# Patient Record
Sex: FEMALE | Race: BLACK OR AFRICAN AMERICAN | Employment: PART TIME
[De-identification: names, ages, dates, MRNs, and addresses within clinical notes are randomized per-mention and may not be internally consistent; named-entity substitution may affect disease eponyms.]

---

## 2017-10-01 ENCOUNTER — HEALTH MAINTENANCE LETTER (OUTPATIENT)
Age: 52
End: 2017-10-01

## 2019-12-17 ENCOUNTER — THERAPY VISIT (OUTPATIENT)
Dept: PHYSICAL THERAPY | Facility: CLINIC | Age: 54
End: 2019-12-17
Payer: COMMERCIAL

## 2019-12-17 DIAGNOSIS — M54.50 THORACOLUMBAR BACK PAIN: ICD-10-CM

## 2019-12-17 DIAGNOSIS — M54.6 THORACOLUMBAR BACK PAIN: ICD-10-CM

## 2019-12-17 DIAGNOSIS — M54.2 NECK PAIN: ICD-10-CM

## 2019-12-17 PROCEDURE — 97162 PT EVAL MOD COMPLEX 30 MIN: CPT | Mod: GP | Performed by: PHYSICAL THERAPIST

## 2019-12-17 PROCEDURE — 97112 NEUROMUSCULAR REEDUCATION: CPT | Mod: GP | Performed by: PHYSICAL THERAPIST

## 2019-12-17 PROCEDURE — 97110 THERAPEUTIC EXERCISES: CPT | Mod: GP | Performed by: PHYSICAL THERAPIST

## 2019-12-17 NOTE — PROGRESS NOTES
[facility] Initial Evaluation  Subjective:    Rachel Gallegos being seen for Neck and upper back.   Problem began 12/13/2019 (MD appt). Where condition occurred: for unknown reasons.Problem occurred: Recent flare-up 2-3 weeks ago of an old lifting injury x5 years ago  and reported as 6/10 (up to 8/10) on pain scale. General health as reported by patient is good. Pertinent medical history includes:  High blood pressure, menopausal and overweight.  Medical allergies: none.  Surgeries include:  Orthopedic surgery (Bunion L foot x3 years ago).  Current medications:  High blood pressure medication.   Primary job tasks include:  Prolonged sitting and prolonged standing.  Pain is described as aching and is constant. Pain is worse during the night. Since onset symptoms are gradually improving. Imaging testing: none. Previous treatment includes physical therapy. There was moderate improvement following previous treatment.   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    Barriers include:  None as reported by patient.  Red flags:  None as reported by patient.  Type of problem:  Cervical spine and thoracic spine (and lumbar)   Condition occurred with:  Insidious onset. This is a recurrent condition    Patient reports pain:  Cervical left side and thoracic right side.  Associated symptoms:  Loss of strength and loss of motion/stiffness. Symptoms are exacerbated by sitting, lying down, looking up or down, driving and rotating head (standing) and relieved by heat and ice.                      Objective:    Gait:    Gait Type:  Normal   Assistive Devices:  None      Flexibility/Screens:       Lower Extremity:  Decreased left lower extremity flexibility:Hamstrings and Gastroc    Decreased right lower extremity flexibility:  Hamstrings and Gastroc  Spine:      Decreased right spine flexibility:  Quadratus Lumborum             Lumbar/SI Evaluation    Lumbar Myotomes:   normal                Lumbar Dermtomes:  normal                Neural Tension/Mobility:  Lumbar:  Normal        Lumbar Palpation:    Tenderness present at Left:    Erector Spinae  Tenderness present at Right: Quadratus Lumborum and Erector Spinae  Functional Tests:  Core strength and proprioception lumbar: upper abdominal 3-/5 with increased LBP.          Spinal Segmental Conclusions:     Level: Hypo noted at T12, L1, L2, L3, L4 and L5           Cervical/Thoracic Evaluation      Cervical Myotomes:          C5 (Deltoid):  Left: 4    Right: 4  C6 (Biceps):  Left: 5    Right: 5  C7 (Triceps):  Left: 4+    Right: 4+          Cervical Dermatomes:  normal                    Cervical Palpation:    Tenderness present at Left:    Upper Trap; Levator; Erector Spinae and Suboccipitals  Tenderness present at Right:    Upper Trap; Levator; Erector Spinae and Suboccipitals                                                  Santosh Cervical Evaluation    Posture:        Wry Neck: no  Correction of Posture: better    Movement Loss:  Protrusion (PRO): nil and pain  Flexion (Flex): nil  Retraction (RET): min and pain  Extension (EXT): min  Lateral Flexion Right (LF R): min  Lateral Flexion Left (LF L): min  Rotation Right (ROT R): mod and pain  Rotation Left (ROT L): min and pain  Test Movements:  Pretest Pain Sitting: B cervical/UT    RET: During: increases  After: no worse  Mechanical Response: no effect  Repeat RET: During: decreases  Mechanical Response: IncROM  RET EXT: During: increases  After: no worse  Mechanical Response: no effect  Repeat RET EXT: After: no better  Mechanical Response: no effect                        Conclusion: other (vs derangement)  Principle of Treatment:  Posture Correction: slouch/over correct, location of neutral spine, use of lumbar support    Extension: seated retraction with pt OP      Santosh Lumbar Evaluation    Posture:  Sitting: poor  Standing: fair  Lordosis: WNL  Lateral Shift:  no  Correction of Posture: better    Movement Loss:  Flexion (Flex): mod and pain  Extension (EXT): mod and pain  Side [place] R (SG R): nil  Side Glide L (SG L): nil  Test Movements:  FIS: During: increases  After: no worse  Mechanical Response: no effectPretest Movements: R Thoracic/lumbar  Repeat FIS: During: increases  After: worse  Mechanical Response: DecrROM  EIS: During: increases  After: no worse  Mechanical Response: no effect  Repeat EIS: During: centralizing  After: better  Mechanical Response: IncROM    EIL: During: centralizing  After: no better  Mechanical Response: no effect  Repeat EIL: During: centralizing  After: better  Mechanical Response: IncROM      Static Tests:          Lying Prone in Extension: prone lying and INES's decrease/B     Conclusion: derangement  Principle of Treatment:  Posture Correction: slouch/over correct, location of neutral spine, use of lumbar support    Extension: prone lying, INES's, press-ups, EIS(sagging hips into counter)                                           ROS    Assessment/Plan:    Patient is a 54 year old female with cervical and thoracic/lumbar complaints.    Patient has the following significant findings with corresponding treatment plan.                Diagnosis 1:  Neck pain  Pain -  manual therapy, self management, education, directional preference exercise and home program  Decreased ROM/flexibility - manual therapy, therapeutic exercise, therapeutic activity and home program  Decreased strength - therapeutic exercise, therapeutic activities and home program  Decreased function - therapeutic activities and home program  Impaired posture - neuro re-education, therapeutic activities and home program  Diagnosis 2:  Thoracic/lumbar    Pain -  manual therapy, self management, education, directional preference exercise and home program  Decreased ROM/flexibility - manual therapy, therapeutic exercise, therapeutic activity and home program  Decreased joint  mobility - manual therapy, therapeutic exercise, therapeutic activity and home program  Decreased strength - therapeutic exercise, therapeutic activities and home program  Decreased function - therapeutic activities and home program  Impaired posture - neuro re-education, therapeutic activities and home program    Therapy Evaluation Codes:   1) History comprised of:   Personal factors that impact the plan of care:      Profession.    Comorbidity factors that impact the plan of care are:      Previous injury 5 years ago.     Medications impacting care: None.  2) Examination of Body Systems comprised of:   Body structures and functions that impact the plan of care:      Cervical spine, Lumbar spine and Thoracic Spine.   Activity limitations that impact the plan of care are:      Bending, Driving, Lifting, Sitting, Standing, Working and Sleeping.  3) Clinical presentation characteristics are:   Evolving/Changing.  4) Decision-Making    Moderate complexity using standardized patient assessment instrument and/or measureable assessment of functional outcome.  Cumulative Therapy Evaluation is: Moderate complexity.    Previous and current functional limitations:  (See Goal Flow Sheet for this information)    Short term and Long term goals: (See Goal Flow Sheet for this information)     Communication ability:  Patient appears to be able to clearly communicate and understand verbal and written communication and follow directions correctly.  Treatment Explanation - The following has been discussed with the patient:   RX ordered/plan of care  Anticipated outcomes  Possible risks and side effects  This patient would benefit from PT intervention to resume normal activities.   Rehab potential is good.    Frequency:  1 X week, once daily  Duration:  for 6 weeks  Discharge Plan:  Achieve all LTG.  Independent in home treatment program.  Reach maximal therapeutic benefit.    Please refer to the daily flowsheet for treatment today,  total treatment time and time spent performing 1:1 timed codes.

## 2019-12-17 NOTE — LETTER
[facility]  [address]  [facility] [address]  [phone]    2019    Re: Rachel Gallegos   :   1965  MRN:  [id number]   REFERRING PHYSICIAN:   Enrique Luz    [facility]    Date of Initial Evaluation:  2019  Visits:  Rxs Used: 1  Reason for Referral:     Neck pain  Thoracolumbar back pain    EVALUATION SUMMARY    Subjective:  Rachel Gallegos being seen for Neck and upper back.   Problem began 2019 (MD appt). Where condition occurred: for unknown reasons.Problem occurred: Recent flare-up 2-3 weeks ago of an old lifting injury x5 years ago  and reported as 6/10 (up to 8/10) on pain scale. General health as reported by patient is good. Pertinent medical history includes:  High blood pressure, menopausal and overweight.  Medical allergies: none.  Surgeries include:  Orthopedic surgery (Bunion L foot x3 years ago).  Current medications:  High blood pressure medication.   Primary job tasks include:  Prolonged sitting and prolonged standing.  Pain is described as aching and is constant. Pain is worse during the night. Since onset symptoms are gradually improving. Imaging testing: none. Previous treatment includes physical therapy. There was moderate improvement following previous treatment.   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  Barriers include:  None as reported by patient.  Red flags:  None as reported by patient.  Type of problem:  Cervical spine and thoracic spine (and lumbar)  Condition occurred with:  Insidious onset. This is a recurrent condition    Patient reports pain:  Cervical left side and thoracic right side.  Associated symptoms:  Loss of strength and loss of motion/stiffness. Symptoms are exacerbated by sitting, lying down, looking up or down, driving and rotating head (standing)  and relieved by heat and ice.                 Objective:  Gait:    Gait Type:  Normal   Assistive Devices:  None  Flexibility/Screens:   Lower Extremity:  Decreased left lower extremity flexibility:Hamstrings and Gastroc  Decreased right lower extremity flexibility:  Hamstrings and Gastroc  Spine:  Decreased right spine flexibility:  Quadratus Lumborum      Re: Rachel Gallegos   :   1965         Lumbar/SI Evaluation  Lumbar Myotomes:  normal  Lumbar Dermtomes:  normal  Neural Tension/Mobility:  Lumbar:  Normal    Lumbar Palpation:    Tenderness present at Left:    Erector Spinae  Tenderness present at Right: Quadratus Lumborum and Erector Spinae  Functional Tests:  Core strength and proprioception lumbar: upper abdominal 3-/5 with increased LBP.  Spinal Segmental Conclusions:   Level: Hypo noted at T12, L1, L2, L3, L4 and L5       Cervical/Thoracic Evaluation  Cervical Myotomes:    C5 (Deltoid):  Left: 4    Right: 4  C6 (Biceps):  Left: 5    Right: 5  C7 (Triceps):  Left: 4+    Right: 4+  Cervical Dermatomes:  normal  Cervical Palpation:    Tenderness present at Left:    Upper Trap; Levator; Erector Spinae and Suboccipitals  Tenderness present at Right:    Upper Trap; Levator; Erector Spinae and Suboccipitals    Santosh Cervical Evaluation  Posture:  Wry Neck: no  Correction of Posture: better  Movement Loss:  Protrusion (PRO): nil and pain  Flexion (Flex): nil  Retraction (RET): min and pain  Extension (EXT): min  Lateral Flexion Right (LF R): min  Lateral Flexion Left (LF L): min  Rotation Right (ROT R): mod and pain  Rotation Left (ROT L): min and pain  Test Movements:  Pretest Pain Sitting: B cervical/UT  RET: During: increases  After: no worse  Mechanical Response: no effect  Repeat RET: During: decreases  Mechanical Response: IncROM  RET EXT: During: increases  After: no worse  Mechanical Response: no effect  Repeat RET EXT: After: no better  Mechanical Response: no effect  Conclusion: other (vs  derangement)  Principle of Treatment:  Posture Correction: slouch/over correct, location of neutral spine, use of lumbar support  Extension: seated retraction with pt OP    Santosh Lumbar Evaluation  Posture:  Sitting: poor  Standing: fair  Lordosis: WNL  Lateral Shift: no  Correction of Posture: better  Movement Loss:  Flexion (Flex): mod and pain  Extension (EXT): mod and pain  Side [place] R (SG R): nil  Side Glide L (SG L): nil  Test Movements:  FIS: During: increases  After: no worse  Mechanical Response: no effectPretest Movements: R Thoracic/lumbar  Repeat FIS: During: increases  After: worse  Mechanical Response: DecrROM  EIS: During: increases  After: no worse  Mechanical Response: no effect  Repeat EIS: During: centralizing  After: better  Mechanical Response: IncROM  EIL: During: centralizing  After: no better  Mechanical Response: no effect  Repeat EIL: During: centralizing  After: better  Mechanical Response: IncROM  Static Tests:  Lying Prone in Extension: prone lying and INES's decrease/B   Conclusion: derangement  Principle of Treatment:  Posture Correction: slouch/over correct, location of neutral spine, use of lumbar support  Extension: prone lying, INES's, press-ups, EIS(sagging hips into counter)    Assessment/Plan:    Patient is a 54 year old female with cervical and thoracic/lumbar complaints.    Patient has the following significant findings with corresponding treatment plan.                Diagnosis 1:  Neck pain  Pain -  manual therapy, self management, education, directional preference exercise and home program  Decreased ROM/flexibility - manual therapy, therapeutic exercise, therapeutic activity and home program  Decreased strength - therapeutic exercise, therapeutic activities and home program  Decreased function - therapeutic activities and home program  Impaired posture - neuro re-education, therapeutic activities and home program  Diagnosis 2:  Thoracic/lumbar    Pain -  manual therapy,  self management, education, directional preference exercise and home program  Decreased ROM/flexibility - manual therapy, therapeutic exercise, therapeutic activity and home program  Decreased joint mobility - manual therapy, therapeutic exercise, therapeutic activity and home program  Decreased strength - therapeutic exercise, therapeutic activities and home program  Decreased function - therapeutic activities and home program  Impaired posture - neuro re-education, therapeutic activities and home program    Therapy Evaluation Codes:   1) History comprised of:   Personal factors that impact the plan of care:      Profession.    Comorbidity factors that impact the plan of care are:      Previous injury 5 years ago.     Medications impacting care: None.  2) Examination of Body Systems comprised of:   Body structures and functions that impact the plan of care:      Cervical spine, Lumbar spine and Thoracic Spine.   Activity limitations that impact the plan of care are:    Re: Rachel JENNIFER Gallegos   :   1965        Bending, Driving, Lifting, Sitting, Standing, Working and Sleeping.  3) Clinical presentation characteristics are:   Evolving/Changing.  4) Decision-Making    Moderate complexity using standardized patient assessment instrument and/or measureable assessment of functional outcome.  Cumulative Therapy Evaluation is: Moderate complexity.    Previous and current functional limitations:  (See Goal Flow Sheet for this information)    Short term and Long term goals: (See Goal Flow Sheet for this information)     Communication ability:  Patient appears to be able to clearly communicate and understand verbal and written communication and follow directions correctly.  Treatment Explanation - The following has been discussed with the patient:   RX ordered/plan of care  Anticipated outcomes  Possible risks and side effects  This patient would benefit from PT intervention to resume normal activities.   Rehab  potential is good.    Frequency:  1 X week, once daily  Duration:  for 6 weeks  Discharge Plan:  Achieve all LTG.  Independent in home treatment program.  Reach maximal therapeutic benefit.    Thank you for your referral.      INQUIRIES  Therapist: Milana Kc PT  [facility]  [address] [address]  Phone: [phone]  Fax: [phone]

## 2019-12-20 ENCOUNTER — THERAPY VISIT (OUTPATIENT)
Dept: PHYSICAL THERAPY | Facility: CLINIC | Age: 54
End: 2019-12-20
Payer: COMMERCIAL

## 2019-12-20 DIAGNOSIS — M54.2 NECK PAIN: ICD-10-CM

## 2019-12-20 DIAGNOSIS — M54.6 THORACOLUMBAR BACK PAIN: ICD-10-CM

## 2019-12-20 DIAGNOSIS — M54.50 THORACOLUMBAR BACK PAIN: ICD-10-CM

## 2019-12-20 PROCEDURE — 97110 THERAPEUTIC EXERCISES: CPT | Mod: GP | Performed by: PHYSICAL THERAPY ASSISTANT

## 2019-12-20 PROCEDURE — 97140 MANUAL THERAPY 1/> REGIONS: CPT | Mod: GP | Performed by: PHYSICAL THERAPY ASSISTANT

## 2020-03-25 PROBLEM — M54.6 THORACOLUMBAR BACK PAIN: Status: RESOLVED | Noted: 2019-12-17 | Resolved: 2020-03-25

## 2020-03-25 PROBLEM — M54.50 THORACOLUMBAR BACK PAIN: Status: RESOLVED | Noted: 2019-12-17 | Resolved: 2020-03-25

## 2020-03-25 PROBLEM — M54.2 NECK PAIN: Status: RESOLVED | Noted: 2019-12-17 | Resolved: 2020-03-25
